# Patient Record
Sex: MALE | Race: WHITE | NOT HISPANIC OR LATINO | Employment: FULL TIME | URBAN - METROPOLITAN AREA
[De-identification: names, ages, dates, MRNs, and addresses within clinical notes are randomized per-mention and may not be internally consistent; named-entity substitution may affect disease eponyms.]

---

## 2018-09-10 ENCOUNTER — OFFICE VISIT CONVERTED (OUTPATIENT)
Dept: ORTHOPEDIC SURGERY | Facility: CLINIC | Age: 58
End: 2018-09-10
Attending: PHYSICIAN ASSISTANT

## 2021-05-16 VITALS — HEIGHT: 69 IN | HEART RATE: 78 BPM | WEIGHT: 210 LBS | OXYGEN SATURATION: 99 % | BODY MASS INDEX: 31.1 KG/M2

## 2022-05-09 ENCOUNTER — TRANSCRIBE ORDERS (OUTPATIENT)
Dept: ADMINISTRATIVE | Facility: HOSPITAL | Age: 62
End: 2022-05-09

## 2022-05-09 DIAGNOSIS — R51.9 NONINTRACTABLE HEADACHE, UNSPECIFIED CHRONICITY PATTERN, UNSPECIFIED HEADACHE TYPE: Primary | ICD-10-CM

## 2022-05-12 ENCOUNTER — TRANSCRIBE ORDERS (OUTPATIENT)
Dept: ADMINISTRATIVE | Facility: HOSPITAL | Age: 62
End: 2022-05-12

## 2022-05-12 DIAGNOSIS — G43.701 CHRONIC MIGRAINE W/O AURA, NOT INTRACTABLE, W STAT MIGR: Primary | ICD-10-CM

## 2022-05-31 ENCOUNTER — HOSPITAL ENCOUNTER (OUTPATIENT)
Dept: MRI IMAGING | Facility: HOSPITAL | Age: 62
Discharge: HOME | End: 2022-05-31
Admitting: PHYSICIAN ASSISTANT

## 2022-05-31 ENCOUNTER — APPOINTMENT (OUTPATIENT)
Dept: MRI IMAGING | Facility: HOSPITAL | Age: 62
End: 2022-05-31

## 2022-05-31 DIAGNOSIS — G43.701 CHRONIC MIGRAINE W/O AURA, NOT INTRACTABLE, W STAT MIGR: ICD-10-CM

## 2022-05-31 PROCEDURE — 70551 MRI BRAIN STEM W/O DYE: CPT

## 2023-01-12 ENCOUNTER — TRANSCRIBE ORDERS (OUTPATIENT)
Dept: ADMINISTRATIVE | Facility: HOSPITAL | Age: 63
End: 2023-01-12
Payer: OTHER GOVERNMENT

## 2023-01-12 DIAGNOSIS — J32.8 OTHER CHRONIC SINUSITIS: Primary | ICD-10-CM

## 2023-01-18 ENCOUNTER — TELEPHONE (OUTPATIENT)
Dept: URGENT CARE | Facility: CLINIC | Age: 63
End: 2023-01-18

## 2023-01-18 PROCEDURE — U0004 COV-19 TEST NON-CDC HGH THRU: HCPCS | Performed by: NURSE PRACTITIONER

## 2023-01-18 NOTE — TELEPHONE ENCOUNTER
----- Message from NIKITA Platt sent at 1/18/2023  5:14 PM EST -----  Please notify patient of negative COVID-19 test result.

## 2023-02-02 ENCOUNTER — HOSPITAL ENCOUNTER (OUTPATIENT)
Dept: CT IMAGING | Facility: HOSPITAL | Age: 63
Discharge: HOME | End: 2023-02-02
Admitting: PHYSICIAN ASSISTANT
Payer: OTHER GOVERNMENT

## 2023-02-02 DIAGNOSIS — J32.8 OTHER CHRONIC SINUSITIS: ICD-10-CM

## 2023-02-02 PROCEDURE — 70486 CT MAXILLOFACIAL W/O DYE: CPT

## 2024-07-29 ENCOUNTER — OFFICE VISIT (OUTPATIENT)
Dept: SURGERY | Facility: CLINIC | Age: 64
End: 2024-07-29
Payer: OTHER GOVERNMENT

## 2024-07-29 VITALS — BODY MASS INDEX: 33.5 KG/M2 | HEIGHT: 70 IN | WEIGHT: 234 LBS | RESPIRATION RATE: 14 BRPM

## 2024-07-29 DIAGNOSIS — R13.19 ESOPHAGEAL DYSPHAGIA: Primary | ICD-10-CM

## 2024-07-29 PROCEDURE — 99203 OFFICE O/P NEW LOW 30 MIN: CPT | Performed by: SURGERY

## 2024-07-29 RX ORDER — ATORVASTATIN CALCIUM 20 MG/1
20 TABLET, FILM COATED ORAL DAILY
COMMUNITY
Start: 2024-06-28 | End: 2024-12-25

## 2024-07-29 RX ORDER — NAPROXEN 500 MG/1
500 TABLET ORAL
COMMUNITY
Start: 2024-05-13 | End: 2024-08-11

## 2024-07-29 NOTE — PROGRESS NOTES
Inpatient History and Physical Surgical Orders    Preadmission Location:   Preadmission Time:  Facility:  Surgery Date:  Surgery Time:  Preadmission Test date:     Chief Complaint  Outpatient History and Physical / Surgical Orders    Primary Care Provider: Mustapha Valadez PA    Referring Provider: Lorenza Duenas MD    Subjective      Patient Name: Gregg Waller : 1960    HPI  The patient is a 64-year-old gentleman who is presenting with increasing trouble with dysphagia.  He has had a couple of incidents where he has had food get stuck in his esophagus that he could not pass.  He also has been having a little bit more reflux than before.    Past History:  Medical History: has a past medical history of Hypertension.   Surgical History: has a past surgical history that includes [place] tooth extraction and Eye surgery.   Family History: family history includes No Known Problems in his father and mother.   Social History: reports that he has never smoked. He has never used smokeless tobacco. He reports that he does not drink alcohol and does not use drugs.  Allergies: Cat hair extract and Penicillins       Current Outpatient Medications:     atorvastatin (LIPITOR) 20 MG tablet, Take 1 tablet by mouth Daily., Disp: , Rfl:     naproxen (EC NAPROSYN) 500 MG EC tablet, Take 1 tablet by mouth., Disp: , Rfl:     Rimegepant Sulfate (NURTEC) 75 MG tablet dispersible tablet, Take 1 tablet by mouth Every Other Day., Disp: , Rfl:     tadalafil (CIALIS) 20 MG tablet, TAKE 1/2 TO 1 (ONE-HALF TO ONE) TABLET BY MOUTH ONCE DAILY AS NEEDED FOR ERECTILE DYSFUNCTION , TAKE 1-2 HOURS PRIOR TO INTERCOURSE, Disp: , Rfl:     telmisartan (MICARDIS) 40 MG tablet, Take 1 tablet by mouth Daily., Disp: , Rfl:     methylPREDNISolone (MEDROL) 4 MG dose pack, Take as directed on package instructions., Disp: 21 tablet, Rfl: 0    simvastatin (ZOCOR) 5 MG tablet, Zocor 5 mg oral tablet take 1 tablet (5 mg) by oral route once  "daily in the evening   Active, Disp: , Rfl:        Objective   Vital Signs:   Resp 14   Ht 177.8 cm (70\")   Wt 106 kg (234 lb)   BMI 33.58 kg/m²       Physical Exam  Vitals and nursing note reviewed.   Constitutional:       Appearance: Normal appearance. The patient is well-developed.   Cardiovascular:      Rate and Rhythm: Normal rate and regular rhythm.   Pulmonary:      Effort: Pulmonary effort is normal.      Breath sounds: Normal air entry.   Abdominal:      General: Bowel sounds are normal.      Palpations: Abdomen is soft.      Skin:     General: Skin is warm and dry.   Neurological:      Mental Status: The patient is alert and oriented to person, place, and time.      Motor: Motor function is intact.   Psychiatric:         Mood and Affect: Mood normal.       Result Review :               Assessment and Plan   Diagnoses and all orders for this visit:    1. Esophageal dysphagia (Primary)  -     Verify NPO; Standing  -     Obtain Informed Consent; Standing  -     Case Request; Standing  -     Case Request    We will schedule him for an EGD.  I have described the procedure to him as well as the risk and benefits and he is agreeable to proceeding.    I  Maximilian De MD  07/29/2024    "

## 2024-09-10 ENCOUNTER — TELEPHONE (OUTPATIENT)
Dept: SURGERY | Facility: CLINIC | Age: 64
End: 2024-09-10
Payer: OTHER GOVERNMENT